# Patient Record
Sex: MALE | Race: WHITE
[De-identification: names, ages, dates, MRNs, and addresses within clinical notes are randomized per-mention and may not be internally consistent; named-entity substitution may affect disease eponyms.]

---

## 2024-10-22 ENCOUNTER — OFFICE VISIT (OUTPATIENT)
Age: 54
End: 2024-10-22
Payer: COMMERCIAL

## 2024-10-22 ENCOUNTER — PATIENT MESSAGE (OUTPATIENT)
Age: 54
End: 2024-10-22

## 2024-10-22 ENCOUNTER — TELEPHONE (OUTPATIENT)
Dept: SURGERY | Facility: CLINIC | Age: 54
End: 2024-10-22

## 2024-10-22 VITALS
WEIGHT: 260 LBS | HEIGHT: 72 IN | HEART RATE: 76 BPM | SYSTOLIC BLOOD PRESSURE: 126 MMHG | BODY MASS INDEX: 35.21 KG/M2 | OXYGEN SATURATION: 97 % | DIASTOLIC BLOOD PRESSURE: 80 MMHG

## 2024-10-22 DIAGNOSIS — R73.03 PREDIABETES: ICD-10-CM

## 2024-10-22 DIAGNOSIS — F32.A DEPRESSION, UNSPECIFIED DEPRESSION TYPE: ICD-10-CM

## 2024-10-22 DIAGNOSIS — E66.812 OBESITY, CLASS II, BMI 35-39.9: ICD-10-CM

## 2024-10-22 DIAGNOSIS — G47.33 OSA ON CPAP: ICD-10-CM

## 2024-10-22 DIAGNOSIS — I10 ESSENTIAL HYPERTENSION, BENIGN: Primary | ICD-10-CM

## 2024-10-22 PROCEDURE — 3079F DIAST BP 80-89 MM HG: CPT | Performed by: INTERNAL MEDICINE

## 2024-10-22 PROCEDURE — 99204 OFFICE O/P NEW MOD 45 MIN: CPT | Performed by: INTERNAL MEDICINE

## 2024-10-22 PROCEDURE — 3074F SYST BP LT 130 MM HG: CPT | Performed by: INTERNAL MEDICINE

## 2024-10-22 PROCEDURE — 3008F BODY MASS INDEX DOCD: CPT | Performed by: INTERNAL MEDICINE

## 2024-10-22 RX ORDER — LISINOPRIL 20 MG/1
20 TABLET ORAL DAILY
COMMUNITY
Start: 2024-03-27

## 2024-10-22 RX ORDER — ESCITALOPRAM OXALATE 5 MG/1
5 TABLET ORAL DAILY
COMMUNITY
Start: 2024-05-01

## 2024-10-22 RX ORDER — AMLODIPINE BESYLATE 2.5 MG/1
2.5 TABLET ORAL DAILY
COMMUNITY

## 2024-10-22 RX ORDER — TIRZEPATIDE 2.5 MG/.5ML
2.5 INJECTION, SOLUTION SUBCUTANEOUS WEEKLY
Qty: 2 ML | Refills: 0 | Status: SHIPPED | OUTPATIENT
Start: 2024-10-22 | End: 2024-11-13

## 2024-10-22 NOTE — PATIENT INSTRUCTIONS
Please try to work on the following dietary changes:  Goals: Aim for 20-30 grams of protein/ meal  Aim for <100 grams of carbohydrates/day  Eat 4-6 vegetables/day  Avoid skipping meals- eat every 4-5 hours  Aim for 3 meals/day  2. Drink lots of water and cut down on soda/juice consumption if soda/juice drinker  3. Focus on protein: (15-30 grams with each meal) ie. greek yogurt, cottage cheese, string cheese, hard boiled eggs  4. Healthy snacks: always have protein in your snack! peanut butter and apples, hummus and carrots, berries, nuts (1/4 cup), tuna and crackers                 Protein Shakes: Premier protein or Core Power                Protein Bars: Rx Bars, Oatmega, Power Crunch                 [place] balanced breaks (cheese and nuts)- without chocolate  5. Reduce carbohydrates <100 grams which includes sweets as well as rice, pasta, potatoes, bread, corn and instead choose whole grain options or more protein or vegetables (4-6 servings of vegetables per day). Use [employer] pascale for carb counting!  6. Get a good night of sleep  7. Try to decrease stress in life; meditate at least 10 minutes before sleeping! Try it and let me know what works for you, try youInfluxube or apps like Calm and [email or web address]!     Please download apps:  1. \"My Fitness Pal\" (other option is Lose it)) to help you to monitor daily dietary intake and you will be able to see if you are eating the right amount of calories, protein, carbs                With My Fitness Pal-->When you set-up the pascale or need to adjust settings:                Goals should include:                 Lose 1.5-2 lbs per week                Activity level: not very active (can't count exercise towards calorie number per day)                   ** Daily INPUT> Look at nutrition section-- \"nutrients\" and it will break down your macros for the day (ie. Protein, carbs, fibers, sugars and fats). Try to stay within these numbers daily     2. \"7 minute workout\" to help with  exercise/activity which takes 7 minutes of your day and that you can do at home!   3. \"Calm\" or \"Headspace\" which helps with mindfulness, meditation, clarity, sleep, and ezekiel to your daily life.   4. [employer] blog for healthy recipe ideas  5. [employer] for low carb resources     HIGH PROTEIN SNACK IDEAS  -cottage cheese  -plain yogurt  -kefir  -hard-boiled eggs  -natural cheeses  -nuts (measure portion size)   -unsweetened nut butters  -dried edamame   -anahi seeds soaked in water or almond milk  -soy nuts  -cured meats (monitor for sodium issues)   -hummus with vegetables  -bean dip with vegetables     FRUIT  Low carb fruit options   Raspberries: Half a cup (60 grams) contains 3 grams of carbs.  Blackberries: Half a cup (70 grams) contains 4 grams of carbs.  Strawberries: Half a cup (100 grams) contains 6 grams of carbs.  Blueberries: Half a cup (50 grams) contains 6 grams of carbs.  Plum: One medium-sized (80 grams) contains 6 grams of carbs.     VEGETABLES  Low carb vegetables             Understanding Carbohydrates  Goal <100g  A car needs the right type of fuel to run. And you need the right kind of food to function. To keep your energy level up, your body needs food that has carbohydrates (carbs). But carbs raise blood sugar levels higher and faster than other kinds of food. Your dietitian will work with you to figure out the amount of carbs youneed. Carbs come in 3 types: starches, sugars, and fiber.   Starches  Starches are found in grains, some vegetables, and beans. Grain products include bread, pasta, cereal, and tortillas. Starchy vegetables include potatoes, peas, corn, lima beans, yams, and squash. Kidney beans, garcia beans,black beans, garbanzo beans, and lentils also have starches.     Sugars  Sugars are found naturally in many foods. Or they can be added. Foods that contain natural sugar include fruits and fruit juices, dairy products, honey, and molasses. Added sugars are found in most  desserts, processed foods, candy, regular soda, and fruit drinks. These are very helpful to treat low blood sugar (hypoglycemia). They give you sugar quickly. Try to keep at least 15 to 20 grams of these simple sugars with you at all times. Eat or drink these if you start to havesymptoms of low blood sugar.   Fiber  Fiber comes from plant foods. Your body can't digest most fiber. Instead of raising blood sugar levels like other carbs, fiber stops blood sugar from rising too fast. Fiber is found in fruits, vegetables, whole grains, beans,peas, and many nuts.   Carb counting  Keep track of the amount of carbs you eat. This can help you keep the right balance of carbs, physical activity, and medicine. The amount of carbs you need will be different from what other people need. How much you need depends on many things. These include your health, the medicines you take, and how active you are. Your healthcare team will help you figure out the right amount of carbs for you. You may start with 45 to 60 grams of carbs per meal, depending on your case. Carb counting is a system that helps you keep track of thecarbohydrates you eat at each meal.   Carbs come from many foods. These include grains, starchy vegetables, fruit, milk, beans, and snack foods. You can either count carbohydrate grams or carbohydrate servings. When you count carbohydrate servings, 1 carbohydrateserving = 15 grams of carbohydrates.   Here are some examples of foods that have about 15 grams of carbs (1 serving of carbohydrates):   1/2 cup of canned or frozen fruit  A small piece of fresh fruit (4 ounces)  1 slice of bread  1/2 cup of oatmeal  1/3 cup of rice  4 to 6 crackers  1/2 English muffin  1/2 cup of black beans  1/4 of a large baked potato (3 ounces)  2/3 cup of plain fat-free yogurt  1 cup of soup  1/2 cup of casserole  6 chicken nuggets  2-inch-square brownie or cake without frosting  2 small cookies  1/2 cup of ice cream or sherbet  Carb  counting is easier when food labels are available. Look at the label to see how many grams of total carbs per serving the food contains. Then you can figure out how much you should eat. If your food doesn't have a nutrition label, you should be able to get an idea how many carbs there are per servingby using a book or website.   Two very important lines to look at on the label are the serving size and the total carbohydrate amount per serving. Here are some tips for using food labelsto count your carbs:   Check the serving size. The information on the label is based on that serving size. If you eat more than the listed serving size, you may have to double or triple the other information on the label.   Check the total grams of carbs.  Total carbohydrate from the label includes sugar, starch, and fiber. Be sure to use the total carbohydrate number (minus the fiber) and not sugar alone.  Know how many grams of carbs you can have.  Be familiar with the matching portion sizes.  Compare labels. Compare the labels of different products. Look at serving sizes and total carbs to find the products that work best for you.   Don't forget protein and fat. With the focus on carb counting, it might be easy to forget protein and fat in your meals. Don't forget to include sources of protein and healthy fat to balance your meals. Also watch how much salt (sodium) you eat. This is especially true if you have high blood pressure. If you have diabetes, limit the amount of sodium to less than 2,300 mg a day.    It’s also important to be consistent with the amount of carbs and time you eat when taking a fixed dose of diabetes medicine. Work with your healthcare provider or dietitian if you need more help. They can help you keep track of your carbs. They can also help you figure out how many grams of carbs youshould have.

## 2024-10-22 NOTE — PROGRESS NOTES
CC:   Chief Complaint   Patient presents with    Consult    Weight Management       Reason for medical consultation: Medical Management of Weight and Weight related comorbidities.      HPI:       Body mass index is 35.26 kg/m².   Wt Readings from Last 6 Encounters:   10/22/24 260 lb (117.9 kg)     /80 (BP Location: Right arm, Patient Position: Sitting, Cuff Size: adult)   Pulse 76   Ht 6' (1.829 m)   Wt 260 lb (117.9 kg)   SpO2 97%   BMI 35.26 kg/m²   Vitals:    10/22/24 1235   BP: 126/80   Pulse: 76     Body mass index is 35.26 kg/m².  Waist Circumference: 47 inches          LABS and RESULTS:     No results found for any previous visit.       Current Outpatient Medications   Medication Sig Dispense Refill    escitalopram 5 MG Oral Tab Take 1 tablet (5 mg total) by mouth daily.      lisinopril 20 MG Oral Tab Take 1 tablet (20 mg total) by mouth daily.      Tirzepatide-Weight Management (ZEPBOUND) 2.5 MG/0.5ML Subcutaneous Solution Auto-injector Inject 2.5 mg into the skin once a week for 4 doses. 2 mL 0    amLODIPine 2.5 MG Oral Tab Take 1 tablet (2.5 mg total) by mouth daily.        History reviewed. No pertinent past medical history.  History reviewed. No pertinent surgical history.    Social History:  Social History     Socioeconomic History    Marital status:    Tobacco Use    Smoking status: Never    Smokeless tobacco: Never     Social Drivers of Health     Financial Resource Strain: Low Risk  (7/22/2024)    Received from [facility]    Overall Financial Resource Strain (CARDIA)     Difficulty of Paying Living Expenses: Not hard at all   Food Insecurity: No Food Insecurity (7/22/2024)    Received from [facility]    Hunger Vital Sign     Worried About Running Out of Food in the Last Year: Never true     Ran Out of Food in the Last Year: Never true   Transportation Needs: No Transportation Needs (7/22/2024)    Received from [facility]  [place]    PRAPARE - Transportation     Lack of Transportation (Medical): No     Lack of Transportation (Non-Medical): No   Physical Activity: Sufficiently Active (6/17/2020)    Received from [facility]    Exercise Vital Sign     Days of Exercise per Week: 7 days     Minutes of Exercise per Session: 40 min   Stress: No Stress Concern Present (6/17/2020)    Received from [facility]    [employer] [place] [employer] - Occupational Stress Questionnaire     Feeling of Stress : Not at all   Social Connections: Socially Integrated (6/17/2020)    Received from [facility]    Social Connection and Isolation Panel [NHANES]     Frequency of Communication with Friends and Family: Twice a week     Frequency of Social Gatherings with Friends and Family: Once a week     Attends [employer] Services: More than 4 times per year     Active Member of Clubs or Organizations: Yes     Attends Club or Organization Meetings: 1 to 4 times per year     Marital Status:    Housing Stability: High Risk (7/22/2024)    Received from [facility]    Housing Stability Vital Sign     Unable to Pay for Housing in the Last Year: Yes     Unstable Housing in the Last Year: No     Family History:  History reviewed. No pertinent family history.       REVIEW OF SYSTEMS:   10 point review of systems otherwise negative.  With the exception of HPI and assessment and plan        EXAM:     GENERAL: NAD, conversant  SKIN: good turgor, no jaundice  HEENT: nl external nose and ears  NECK: supple  LUNGS: nl effort, clear to auscultation bilaterally  CARDIO: RRR, no murmur  ABDOMEN: NT/ND, no masses  EXTREMITIES: gait normal, no edema   PSYCH: Oriented times three, appropriate affect        ASSESSMENT AND PLAN:     1. Essential hypertension, benign  2. Prediabetes  3. Obesity, Class II, BMI 35-39.9  4. CHAY on CPAP  5. Depression, unspecified depression type  - Initial  weight 260 lbs, BMI 35.26  -Phentermine, diethylpropion, Qsymia and contrave contraindicated due to uncontrolled hypertension  - The patient participated in a comprehensive weight management program that encourages behavioral modification, reduced calorie diet and increased physical activity with continuing follow up for at least 6 months prior to using drug therapy.  Plan  - Tirzepatide-Weight Management (ZEPBOUND) 2.5 MG/0.5ML Subcutaneous Solution Auto-injector; Inject 2.5 mg into the skin once a week for 4 doses.  Dispense: 2 mL; Refill: 0  Medication will be used with a reduced calorie diet and increased physical activity in the management of exogenous obesity         Regarding Obesity:  Consulted on nutrition and activity  Advised portion-controlled/low carb diet <100 g daily.  Discussed sleep and stress management.    Regarding weight loss medications.  I've discussed with patients the risks and benefits of such weight loss medications.  That these are treatments to be used in conjunction with diet and exercise for promoting health and weight loss.                Return in about 4 weeks (around 11/19/2024).       Current Outpatient Medications:     escitalopram 5 MG Oral Tab, Take 1 tablet (5 mg total) by mouth daily., Disp: , Rfl:     lisinopril 20 MG Oral Tab, Take 1 tablet (20 mg total) by mouth daily., Disp: , Rfl:     Tirzepatide-Weight Management (ZEPBOUND) 2.5 MG/0.5ML Subcutaneous Solution Auto-injector, Inject 2.5 mg into the skin once a week for 4 doses., Disp: 2 mL, Rfl: 0    amLODIPine 2.5 MG Oral Tab, Take 1 tablet (2.5 mg total) by mouth daily., Disp: , Rfl:

## 2024-11-18 ENCOUNTER — PATIENT MESSAGE (OUTPATIENT)
Age: 54
End: 2024-11-18

## 2025-01-06 ENCOUNTER — OFFICE VISIT (OUTPATIENT)
Facility: CLINIC | Age: 55
End: 2025-01-06
Payer: COMMERCIAL

## 2025-01-06 VITALS
OXYGEN SATURATION: 97 % | SYSTOLIC BLOOD PRESSURE: 122 MMHG | BODY MASS INDEX: 34.4 KG/M2 | WEIGHT: 254 LBS | DIASTOLIC BLOOD PRESSURE: 80 MMHG | HEART RATE: 89 BPM | HEIGHT: 72 IN

## 2025-01-06 DIAGNOSIS — Z51.81 THERAPEUTIC DRUG MONITORING: ICD-10-CM

## 2025-01-06 DIAGNOSIS — F32.A DEPRESSION, UNSPECIFIED DEPRESSION TYPE: ICD-10-CM

## 2025-01-06 DIAGNOSIS — E66.812 OBESITY, CLASS II, BMI 35-39.9: ICD-10-CM

## 2025-01-06 DIAGNOSIS — G47.33 OSA ON CPAP: ICD-10-CM

## 2025-01-06 DIAGNOSIS — I10 ESSENTIAL HYPERTENSION, BENIGN: Primary | ICD-10-CM

## 2025-01-06 DIAGNOSIS — R73.03 PREDIABETES: ICD-10-CM

## 2025-01-06 NOTE — PROGRESS NOTES
CC:   Chief Complaint   Patient presents with    Follow - Up    Weight Management       Reason for medical consultation: Medical Management of Weight and Weight related comorbidities.      HPI:   Initial weight: 260 lbs 10/2024  Current weight: 254 lbs  Interval weight loss: 6 lbs  Total weight loss: 6 lbs      Body mass index is 34.45 kg/m².   Wt Readings from Last 6 Encounters:   01/06/25 254 lb (115.2 kg)   10/22/24 260 lb (117.9 kg)     /80   Pulse 89   Ht 6' (1.829 m)   Wt 254 lb (115.2 kg)   SpO2 97%   BMI 34.45 kg/m²   Vitals:    01/06/25 1119   BP: 122/80   Pulse: 89     Body mass index is 34.45 kg/m².             LABS and RESULTS:     No results found for any previous visit.       Current Outpatient Medications   Medication Sig Dispense Refill    CUSTOM MEDICATION Semaglutide/cyanocobalamin injection    Concentration: 5 mg/ 0.5 mL   Amount: 1 ML vial with supplies  Instructions: Inject 1 mg subcutaneous weekly 1 each 0    CUSTOM MEDICATION Semaglutide/cyanocobalamin injection    Concentration: 1 mg/ 0.5 mL   Amount: 2.5 ML vial with supplies  Instructions: Inject 0.25 mL weekly 1 each 0    amLODIPine 2.5 MG Oral Tab Take 1 tablet (2.5 mg total) by mouth daily.      escitalopram 5 MG Oral Tab Take 1 tablet (5 mg total) by mouth daily.      lisinopril 20 MG Oral Tab Take 1 tablet (20 mg total) by mouth daily.        History reviewed. No pertinent past medical history.  History reviewed. No pertinent surgical history.    Social History:  Social History     Socioeconomic History    Marital status:    Tobacco Use    Smoking status: Never    Smokeless tobacco: Never     Social Drivers of Health     Financial Resource Strain: Low Risk  (7/22/2024)    Received from [facility]    Overall Financial Resource Strain (CARDIA)     Difficulty of Paying Living Expenses: Not hard at all   Food Insecurity: No Food Insecurity (7/22/2024)    Received from [facility]     Hunger Vital Sign     Worried About Running Out of Food in the Last Year: Never true     Ran Out of Food in the Last Year: Never true   Transportation Needs: No Transportation Needs (7/22/2024)    Received from [facility]    PRAPARE - Transportation     Lack of Transportation (Medical): No     Lack of Transportation (Non-Medical): No   Physical Activity: Sufficiently Active (6/17/2020)    Received from [facility]    Exercise Vital Sign     Days of Exercise per Week: 7 days     Minutes of Exercise per Session: 40 min   Stress: No Stress Concern Present (6/17/2020)    Received from [facility]    [employer] [place] [employer] - Occupational Stress Questionnaire     Feeling of Stress : Not at all   Social Connections: Socially Integrated (6/17/2020)    Received from [facility]    Social Connection and Isolation Panel [NHANES]     Frequency of Communication with Friends and Family: Twice a week     Frequency of Social Gatherings with Friends and Family: Once a week     Attends Orthodox Services: More than 4 times per year     Active Member of Clubs or Organizations: Yes     Attends Club or Organization Meetings: 1 to 4 times per year     Marital Status:    Housing Stability: High Risk (7/22/2024)    Received from [facility]    Housing Stability Vital Sign     Unable to Pay for Housing in the Last Year: Yes     Unstable Housing in the Last Year: No     Family History:  History reviewed. No pertinent family history.       REVIEW OF SYSTEMS:   10 point review of systems otherwise negative.  With the exception of HPI and assessment and plan        EXAM:     GENERAL: NAD, conversant  SKIN: good turgor, no jaundice  HEENT: nl external nose and ears  NECK: supple  LUNGS: nl effort, clear to auscultation bilaterally  CARDIO: RRR, no murmur  ABDOMEN: NT/ND, no masses  EXTREMITIES: gait normal, no  edema   PSYCH: Oriented times three, appropriate affect    ASSESSMENT AND PLAN:     1. Essential hypertension, benign  2. Prediabetes  3. Obesity, Class II, BMI 35-39.9  4. CHAY on CPAP  5. Depression, unspecified depression type  - Initial weight 260 lbs, BMI 35.26  -Phentermine, diethylpropion, Qsymia and contrave contraindicated due to uncontrolled hypertension  -Zepbound not covered with insurance  - The patient participated in a comprehensive weight management program that encourages behavioral modification, reduced calorie diet and increased physical activity with continuing follow up for at least 6 months prior to using drug therapy.  SEMAGLUTIDE (Compounded): At the patient’s request, I am providing a prescription for compounded semaglutide to be used as part of the weight management plan. The patient is aware of the potential side effects, including but not limited to nausea, vomiting, diarrhea, and rare risks such as pancreatitis or gallbladder issues. The patient understands that the prescribing physician is not responsible for any side effects or issues arising from the compounded medication, as its preparation and quality are the sole responsibility of the compounding pharmacy. The patient also acknowledges the need for follow-up appointments for continued prescriptions and understands that medication refills will not be called in.    Plan:    -started Semaglutide increase 1 mg   Medication will be used with a reduced calorie diet and increased physical activity in the management of exogenous obesity         Regarding Obesity:  Consulted on nutrition and activity  Advised portion-controlled/low carb diet <100 g daily.  Discussed sleep and stress management.    Regarding weight loss medications.  I've discussed with patients the risks and benefits of such weight loss medications.  That these are treatments to be used in conjunction with diet and exercise for promoting health and weight loss.                 Return in about 2 months (around 3/6/2025).     Malika Carrera MD

## 2025-03-07 ENCOUNTER — OFFICE VISIT (OUTPATIENT)
Dept: SURGERY | Facility: CLINIC | Age: 55
End: 2025-03-07
Payer: COMMERCIAL

## 2025-03-07 VITALS
BODY MASS INDEX: 34 KG/M2 | HEART RATE: 78 BPM | OXYGEN SATURATION: 92 % | SYSTOLIC BLOOD PRESSURE: 122 MMHG | WEIGHT: 251 LBS | HEIGHT: 72 IN | DIASTOLIC BLOOD PRESSURE: 75 MMHG

## 2025-03-07 DIAGNOSIS — I10 ESSENTIAL HYPERTENSION, BENIGN: Primary | ICD-10-CM

## 2025-03-07 DIAGNOSIS — G47.33 OSA ON CPAP: ICD-10-CM

## 2025-03-07 DIAGNOSIS — F32.A DEPRESSION, UNSPECIFIED DEPRESSION TYPE: ICD-10-CM

## 2025-03-07 DIAGNOSIS — R73.03 PREDIABETES: ICD-10-CM

## 2025-03-07 DIAGNOSIS — Z51.81 THERAPEUTIC DRUG MONITORING: ICD-10-CM

## 2025-03-07 DIAGNOSIS — E66.812 OBESITY, CLASS II, BMI 35-39.9: ICD-10-CM

## 2025-03-07 PROCEDURE — 3078F DIAST BP <80 MM HG: CPT | Performed by: INTERNAL MEDICINE

## 2025-03-07 PROCEDURE — 99214 OFFICE O/P EST MOD 30 MIN: CPT | Performed by: INTERNAL MEDICINE

## 2025-03-07 PROCEDURE — 3008F BODY MASS INDEX DOCD: CPT | Performed by: INTERNAL MEDICINE

## 2025-03-07 PROCEDURE — 3074F SYST BP LT 130 MM HG: CPT | Performed by: INTERNAL MEDICINE

## 2025-03-07 NOTE — PROGRESS NOTES
CC:   Chief Complaint   Patient presents with    Follow - Up    Weight Management       Reason for medical consultation: Medical Management of Weight and Weight related comorbidities.      HPI:   Initial weight: 264 lbs 12/2024  Current weight: 251 lbs  Interval weight loss: 3 lbs  Total weight loss: 13 lbs      Body mass index is 34.04 kg/m².   Wt Readings from Last 6 Encounters:   03/07/25 251 lb (113.9 kg)   01/06/25 254 lb (115.2 kg)   10/22/24 260 lb (117.9 kg)     /75   Pulse 78   Ht 6' (1.829 m)   Wt 251 lb (113.9 kg)   SpO2 92%   BMI 34.04 kg/m²   Vitals:    03/07/25 1116   BP: 122/75   Pulse: 78       Body mass index is 34.04 kg/m².     LABS and RESULTS:     No results found for any previous visit.       Current Outpatient Medications   Medication Sig Dispense Refill    CUSTOM MEDICATION Semaglutide/cyanocobalamin injection    Concentration: 5 mg/ 0.5 mL   Amount: 2.5 mL vial with supplies  Instructions: Inject 20 units subcutaneous weekly 1 each 0    CUSTOM MEDICATION Semaglutide/cyanocobalamin injection    Concentration: 5 mg/ 0.5 mL   Amount: 2 x (2.5 ML vial) with supplies  Instructions: Inject (20 units) 1 mg subcutaneous weekly 1 each 0    amLODIPine 2.5 MG Oral Tab Take 1 tablet (2.5 mg total) by mouth daily.      escitalopram 5 MG Oral Tab Take 1 tablet (5 mg total) by mouth daily.      lisinopril 20 MG Oral Tab Take 1 tablet (20 mg total) by mouth daily.      CUSTOM MEDICATION Semaglutide/cyanocobalamin injection    Concentration: 1 mg/ 0.5 mL   Amount: 2.5 ML vial with supplies  Instructions: Inject 0.25 mL weekly (Patient not taking: Reported on 3/7/2025) 1 each 0      History reviewed. No pertinent past medical history.  History reviewed. No pertinent surgical history.    Social History:  Social History     Socioeconomic History    Marital status:    Tobacco Use    Smoking status: Never    Smokeless tobacco: Never     Social Drivers of Health     Food Insecurity: No Food  Insecurity (7/22/2024)    Received from [facility]    Hunger Vital Sign     Worried About Running Out of Food in the Last Year: Never true     Ran Out of Food in the Last Year: Never true   Transportation Needs: No Transportation Needs (7/22/2024)    Received from [facility]    PRAPARE - Transportation     Lack of Transportation (Medical): No     Lack of Transportation (Non-Medical): No   Stress: No Stress Concern Present (6/17/2020)    Received from [facility]    [place]izen of [place] of Occupational [employer] - Occupational Stress Questionnaire     Feeling of Stress : Not at all   Housing Stability: High Risk (7/22/2024)    Received from [facility]    Housing Stability Vital Sign     Unable to Pay for Housing in the Last Year: Yes     Unstable Housing in the Last Year: No     Family History:  History reviewed. No pertinent family history.       REVIEW OF SYSTEMS:   10 point review of systems otherwise negative.  With the exception of HPI and assessment and plan        EXAM:     GENERAL: NAD, conversant  SKIN: good turgor, no jaundice  HEENT: nl external nose and ears  NECK: supple  LUNGS: nl effort, clear to auscultation bilaterally  CARDIO: RRR, no murmur  ABDOMEN: NT/ND, no masses  EXTREMITIES: gait normal, no edema   PSYCH: Oriented times three, appropriate affect    ASSESSMENT AND PLAN:     1. Essential hypertension, benign  2. Prediabetes  3. Obesity, Class II, BMI 35-39.9  4. CHAY on CPAP  5. Depression, unspecified depression type  - Initial weight 264 lbs, BMI 35.26  -Phentermine, diethylpropion, Qsymia and contrave contraindicated due to uncontrolled hypertension  -Zepbound not covered with insurance  - The patient participated in a comprehensive weight management program that encourages behavioral modification, reduced calorie diet and increased physical activity with continuing follow up for at least 6 months prior to using  drug therapy.  SEMAGLUTIDE (Compounded): At the patient’s request, I am providing a prescription for compounded semaglutide to be used as part of the weight management plan. The patient is aware of the potential side effects, including but not limited to nausea, vomiting, diarrhea, and rare risks such as pancreatitis or gallbladder issues. The patient understands that the prescribing physician is not responsible for any side effects or issues arising from the compounded medication, as its preparation and quality are the sole responsibility of the compounding pharmacy. The patient also acknowledges the need for follow-up appointments for continued prescriptions and understands that medication refills will not be called in.    Plan:    -started Semaglutide 11/2024, continue  1 mg   Medication will be used with a reduced calorie diet and increased physical activity in the management of exogenous obesity         Regarding Obesity:  Consulted on nutrition and activity  Advised portion-controlled/low carb diet <100 g daily.  Discussed sleep and stress management.    Regarding weight loss medications.  I've discussed with patients the risks and benefits of such weight loss medications.  That these are treatments to be used in conjunction with diet and exercise for promoting health and weight loss.                Return in about 3 months (around 6/7/2025).     Malika Carrera MD

## 2025-05-10 ENCOUNTER — PATIENT MESSAGE (OUTPATIENT)
Facility: CLINIC | Age: 55
End: 2025-05-10

## 2025-06-04 RX ORDER — TIRZEPATIDE 2.5 MG/.5ML
INJECTION, SOLUTION SUBCUTANEOUS
Qty: 2 ML | Refills: 0 | Status: SHIPPED | OUTPATIENT
Start: 2025-06-04 | End: 2025-06-09

## 2025-06-09 ENCOUNTER — OFFICE VISIT (OUTPATIENT)
Dept: SURGERY | Facility: CLINIC | Age: 55
End: 2025-06-09
Payer: COMMERCIAL

## 2025-06-09 VITALS
HEIGHT: 72 IN | OXYGEN SATURATION: 98 % | SYSTOLIC BLOOD PRESSURE: 104 MMHG | DIASTOLIC BLOOD PRESSURE: 70 MMHG | WEIGHT: 245 LBS | HEART RATE: 90 BPM | BODY MASS INDEX: 33.18 KG/M2

## 2025-06-09 DIAGNOSIS — G47.33 OSA ON CPAP: ICD-10-CM

## 2025-06-09 DIAGNOSIS — I10 ESSENTIAL HYPERTENSION, BENIGN: Primary | ICD-10-CM

## 2025-06-09 DIAGNOSIS — R73.03 PREDIABETES: ICD-10-CM

## 2025-06-09 DIAGNOSIS — Z51.81 THERAPEUTIC DRUG MONITORING: ICD-10-CM

## 2025-06-09 DIAGNOSIS — E66.812 OBESITY, CLASS II, BMI 35-39.9: ICD-10-CM

## 2025-06-09 DIAGNOSIS — F32.A DEPRESSION, UNSPECIFIED DEPRESSION TYPE: ICD-10-CM

## 2025-06-09 PROCEDURE — 3078F DIAST BP <80 MM HG: CPT | Performed by: INTERNAL MEDICINE

## 2025-06-09 PROCEDURE — 99214 OFFICE O/P EST MOD 30 MIN: CPT | Performed by: INTERNAL MEDICINE

## 2025-06-09 PROCEDURE — 3074F SYST BP LT 130 MM HG: CPT | Performed by: INTERNAL MEDICINE

## 2025-06-09 PROCEDURE — 3008F BODY MASS INDEX DOCD: CPT | Performed by: INTERNAL MEDICINE

## 2025-06-09 RX ORDER — TIRZEPATIDE 10 MG/.5ML
10 INJECTION, SOLUTION SUBCUTANEOUS WEEKLY
Qty: 2 ML | Refills: 2 | Status: SHIPPED | OUTPATIENT
Start: 2025-06-09

## 2025-06-09 NOTE — PROGRESS NOTES
CC:   Chief Complaint   Patient presents with    Follow - Up    Weight Management       Reason for medical consultation: Medical Management of Weight and Weight related comorbidities.      HPI:   Switched with zepbound 2.5 mg through [facility]    Initial weight: 264 lbs 12/2024  Current weight: 245 lbs  Interval weight loss: 6 lbs  Total weight loss: 19 lbs      Body mass index is 33.23 kg/m².   Wt Readings from Last 6 Encounters:   06/09/25 245 lb (111.1 kg)   03/07/25 251 lb (113.9 kg)   01/06/25 254 lb (115.2 kg)   10/22/24 260 lb (117.9 kg)     /70   Pulse 90   Ht 6' (1.829 m)   Wt 245 lb (111.1 kg)   SpO2 98%   BMI 33.23 kg/m²   Vitals:    06/09/25 0829   BP: 104/70   Pulse: 90       Body mass index is 33.23 kg/m².     LABS and RESULTS:     No results found for any previous visit.       Current Outpatient Medications   Medication Sig Dispense Refill    Tirzepatide-Weight Management (ZEPBOUND) 10 MG/0.5ML Subcutaneous Solution Inject 10 mg into the skin once a week. NPI: [id number] NCPDP: [id number] Patient Contact Information Telephone Information: Home Phone      [phone] Mobile          [phone]   (Mobile) Dispense as Vials ICD-10 CODE E66.811 2 mL 2    Syringe/Needle, Disp, 25G X 5/8\" 1 ML Does not apply Misc Use as directed. Do not re-use 50 each 0    amLODIPine 2.5 MG Oral Tab Take 1 tablet (2.5 mg total) by mouth daily.      escitalopram 5 MG Oral Tab Take 1 tablet (5 mg total) by mouth daily.      lisinopril 20 MG Oral Tab Take 1 tablet (20 mg total) by mouth daily.        History reviewed. No pertinent past medical history.  History reviewed. No pertinent surgical history.    Social History:  Social History     Socioeconomic History    Marital status:    Tobacco Use    Smoking status: Never    Smokeless tobacco: Never     Social Drivers of Health     Food Insecurity: No Food Insecurity (7/22/2024)    Received from [facility]    Hunger Vital Sign     Worried  About Running Out of Food in the Last Year: Never true     Ran Out of Food in the Last Year: Never true   Transportation Needs: No Transportation Needs (7/22/2024)    Received from [facility]    PRAPARE - Transportation     Lack of Transportation (Medical): No     Lack of Transportation (Non-Medical): No   Stress: No Stress Concern Present (6/17/2020)    Received from [facility]    [place] of Occupational Health - Occupational Stress Questionnaire     Feeling of Stress : Not at all   Housing Stability: High Risk (7/22/2024)    Received from [facility]    Housing Stability Vital Sign     Unable to Pay for Housing in the Last Year: Yes     Unstable Housing in the Last Year: No     Family History:  History reviewed. No pertinent family history.       REVIEW OF SYSTEMS:   10 point review of systems otherwise negative.  With the exception of HPI and assessment and plan        EXAM:     GENERAL: NAD, conversant  SKIN: good turgor, no jaundice  HEENT: nl external nose and ears  NECK: supple  LUNGS: nl effort, clear to auscultation bilaterally  CARDIO: RRR, no murmur  ABDOMEN: NT/ND, no masses  EXTREMITIES: gait normal, no edema   PSYCH: Oriented times three, appropriate affect    ASSESSMENT AND PLAN:     1. Essential hypertension, benign  2. Prediabetes  3. Obesity, Class II, BMI 35-39.9  4. CHAY on CPAP  5. Depression, unspecified depression type  - Initial weight 264 lbs, BMI 35.26  -Phentermine, diethylpropion, Qsymia and contrave contraindicated due to uncontrolled hypertension  -Zepbound not covered with insurance  - The patient participated in a comprehensive weight management program that encourages behavioral modification, reduced calorie diet and increased physical activity with continuing follow up for at least 6 months prior to using drug therapy.  -started Semaglutide 11/2024-5/2025 stopped due to shortage   patient is interested in GLP1  medications, patient denies any personal or family history of MTC or in patients with Multiple Endocrine Neoplasia syndrome type 2 (MEN 2). Counseled patient regarding the potential risk for MTC with the use of semaglutide and inform them of symptoms of thyroid tumors (eg, a mass in the neck, dysphagia, dyspnea, persistent hoarseness).       Plan:    - started zepbound 5/2025 through [facility]  Medication will be used with a reduced calorie diet and increased physical activity in the management of exogenous obesity         Regarding Obesity:  Consulted on nutrition and activity  Advised portion-controlled/low carb diet <100 g daily.  Discussed sleep and stress management.    Regarding weight loss medications.  I've discussed with patients the risks and benefits of such weight loss medications.  That these are treatments to be used in conjunction with diet and exercise for promoting health and weight loss.                Return in about 3 months (around 9/9/2025).     Malika Carrera MD

## 2025-07-21 RX ORDER — TIRZEPATIDE 10 MG/.5ML
10 INJECTION, SOLUTION SUBCUTANEOUS WEEKLY
Qty: 2 ML | Refills: 0 | Status: SHIPPED | OUTPATIENT
Start: 2025-07-21